# Patient Record
Sex: FEMALE | Race: WHITE
[De-identification: names, ages, dates, MRNs, and addresses within clinical notes are randomized per-mention and may not be internally consistent; named-entity substitution may affect disease eponyms.]

---

## 2017-04-09 ENCOUNTER — HOSPITAL ENCOUNTER (EMERGENCY)
Dept: HOSPITAL 80 - CED | Age: 42
Discharge: HOME | End: 2017-04-09
Payer: COMMERCIAL

## 2017-04-09 VITALS
DIASTOLIC BLOOD PRESSURE: 77 MMHG | RESPIRATION RATE: 16 BRPM | OXYGEN SATURATION: 94 % | HEART RATE: 73 BPM | SYSTOLIC BLOOD PRESSURE: 128 MMHG | TEMPERATURE: 98.6 F

## 2017-04-09 DIAGNOSIS — J06.9: Primary | ICD-10-CM

## 2017-04-09 DIAGNOSIS — J40: ICD-10-CM

## 2017-04-09 NOTE — UCPHY
H & P


Patient Type: Established


Chief Complaint Nursing Narrative: cough and "rattling" lungs since Tuesday.  

No flu immunization.


Time Seen by Provider: 04/09/17 10:17


HPI/ROS: 





CHIEF COMPLAINT:  Cough





History by patient





HISTORY OF PRESENT ILLNESS:  42-year-old woman who presents with 5 days of cough

, some nasal congestion, subjective fever and temperature measured as high as 

100 degrees F at home.  She has had some nausea but no vomiting or diarrhea.  

She also complains of red bumps on her abdomen that have popped up since this 

illness.  She has taken some ibuprofen at home with minimal relief.  She has 

had some body aches but she says these are chronic due to her chronic Lyme 

disease.  She does not smoke.  She works as a .  She did 

not have a flu shot this year.





REVIEW OF SYSTEMS:


As in HPI, and all other systems reviewed and are negative





- Personal History


LMP (Females 10-55): 15-21 Days Ago


Tetanus Vaccine Date: WITHIN 10 YRS





- Medical/Surgical History


Hx Asthma: No


Hx Chronic Respiratory Disease: No


Hx Diabetes: No


Hx Cardiac Disease: Yes


Hx Renal Disease: No


Hx Cirrhosis: No


Hx Alcoholism: No


Hx HIV/AIDS: No


Hx Splenectomy or Spleen Trauma: No


Other PMH: cardiomyopathy,Chronic pain.  Lyme's disease, celiac disease.  c-

sections x 3, tonsills, l knee arthroscopy





- Family History


Significant Family History: No pertinent family hx





- Social History


Smoking Status: Former smoker





- Physical Exam


Exam: 





General Appearance:  Alert and no distress. Obese


Head: normocephalic, atraumatic, no sinus tenderness


Eyes:  Pupils equal and round no injection.


OP: mucus membranes moist, no tonsillar enlargement, no exudates, positive 

erythema


Neck: no meningismus, no cervical nodes, no submandibular nodes


Respiratory:  Chest is nontender, lungs are clear to auscultation. Positive 

proximal clavicle and sternal border tenderness


Cardiac:  regular rate and rhythm.


Gastrointestinal:  Abdomen is soft and nontender, no masses, bowel sounds 

normal.


Musculoskeletal:  Neck is supple and nontender.


Extremities have full range of motion and are nontender.


Skin:  Multiple red blanching papules on her abdomen.


Constitutional: 


 Initial Vital Signs











Temperature (C)  37 C   04/09/17 10:16


 


Heart Rate  73   04/09/17 10:16


 


Respiratory Rate  16   04/09/17 10:16


 


Blood Pressure  128/77 H  04/09/17 10:16


 


O2 Sat (%)  94   04/09/17 10:16








 











O2 Delivery Mode               Room Air














Allergies/Adverse Reactions: 


 





Cephalosporins Allergy (Verified 04/09/17 10:19)


 








Home Medications: 














 Medication  Instructions  Recorded


 


HYDROCODONE BIT/ACETAMINOPHEN  04/09/17


 


NK [No Known Home Meds]  04/09/17


 


Supplements  04/09/17


 


Thyroid  04/09/17


 


VENLAFAXINE HCL  04/09/17


 


Valium  04/09/17














Medical Decision Making


ED Course/Re-evaluation: 





Patient presents with flu-like symptoms but out of the window for treating for 

influenza swab testing was not done.  There is no evidence of systemic toxicity

, hypoxia or respiratory compromise.  We discussed home care and conservative 

measures and return precautions.  Patient was discharged in stable condition.





Departure





- Departure


Disposition: [facility] IP


Clinical Impression: 


 Upper respiratory infection, Bronchitis





Condition: Good


Instructions:  Upper Respiratory Infection (ED)


Additional Instructions: 


You were seen by Dr. Miguelina Lemos today. Return for any worsening or new 

concerns.





Take Tylenol or ibuprofen as needed.  Try hot drinks with honey for your cough 

and sore throat.


Referrals: 


MATT JOY, DO [Primary Care Provider] - As per Instructions


Stand Alone Forms:  Work Excuse





- PQRS


PQRS Measurement: 





NA

## 2017-09-29 ENCOUNTER — HOSPITAL ENCOUNTER (INPATIENT)
Dept: HOSPITAL 80 - CED | Age: 42
LOS: 6 days | Discharge: HOME | DRG: 885 | End: 2017-10-05
Attending: SPECIALIST | Admitting: SPECIALIST
Payer: COMMERCIAL

## 2017-09-29 DIAGNOSIS — Z87.891: ICD-10-CM

## 2017-09-29 DIAGNOSIS — F60.9: ICD-10-CM

## 2017-09-29 DIAGNOSIS — F41.9: ICD-10-CM

## 2017-09-29 DIAGNOSIS — F12.90: ICD-10-CM

## 2017-09-29 DIAGNOSIS — E03.9: ICD-10-CM

## 2017-09-29 DIAGNOSIS — E66.9: ICD-10-CM

## 2017-09-29 DIAGNOSIS — K59.00: ICD-10-CM

## 2017-09-29 DIAGNOSIS — F33.2: Primary | ICD-10-CM

## 2017-09-29 DIAGNOSIS — A69.20: ICD-10-CM

## 2017-09-29 LAB
% IMMATURE GRANULYOCYTES: 0.3 % (ref 0–1.1)
ABSOLUTE IMMATURE GRANULOCYTES: 0.03 10^3/UL (ref 0–0.1)
ABSOLUTE NRBC COUNT: 0 10^3/UL (ref 0–0.01)
ADD DIFF?: NO
ADD MORPH?: NO
ADD SCAN?: NO
ANION GAP SERPL CALC-SCNC: 10 MEQ/L (ref 8–16)
ATYPICAL LYMPHOCYTE FLAG: 0 (ref 0–99)
CALCIUM SERPL-MCNC: 8.8 MG/DL (ref 8.5–10.4)
CHLORIDE SERPL-SCNC: 104 MEQ/L (ref 97–110)
CO2 SERPL-SCNC: 24 MEQ/L (ref 22–31)
CREAT SERPL-MCNC: 0.7 MG/DL (ref 0.6–1)
ERYTHROCYTE [DISTWIDTH] IN BLOOD BY AUTOMATED COUNT: 12.4 % (ref 11.5–15.2)
ETHANOL SERPL-MCNC: < 10 MG/DL (ref 0–10)
FRAGMENT RBC FLAG: 0 (ref 0–99)
GFR SERPL CREATININE-BSD FRML MDRD: > 60 ML/MIN/{1.73_M2}
GLUCOSE SERPL-MCNC: 89 MG/DL (ref 70–100)
HCT VFR BLD CALC: 41.9 % (ref 38–47)
HGB BLD-MCNC: 14.1 G/DL (ref 12.6–16.3)
LEFT SHIFT FLG: 0 (ref 0–99)
LIPEMIA HEMOLYSIS FLAG: 80 (ref 0–99)
MCH RBC BLDCO QN: 31 PG (ref 27.9–34.1)
MCHC RBC AUTO-ENTMCNC: 33.7 G/DL (ref 32.4–36.7)
MCV RBC AUTO: 92.1 FL (ref 81.5–99.8)
NRBC-AUTO%: 0 % (ref 0–0.2)
PLATELET # BLD: 263 10^3/UL (ref 150–400)
PLATELET CLUMPS FLAG: 0 (ref 0–99)
PMV BLD AUTO: 9.9 FL (ref 8.7–11.7)
POTASSIUM SERPL-SCNC: 4 MEQ/L (ref 3.5–5.2)
RBC # BLD AUTO: 4.55 10^6/UL (ref 4.18–5.33)
SODIUM SERPL-SCNC: 138 MEQ/L (ref 134–144)

## 2017-09-29 PROCEDURE — G0480 DRUG TEST DEF 1-7 CLASSES: HCPCS

## 2017-09-29 NOTE — EDPHY
H & P


Stated Complaint: depression and anxiety getting worse, si thought started 

yesterday,


HPI/ROS: 





CHIEF COMPLAINT: 


Depression, suicidal ideation





HISTORY OF PRESENT ILLNESS: 


Patient complains of history of anxiety and depression.  She is recently been 

transitioned from Effexor to Zoloft 2 weeks ago.  She has had the max dose of 

Zoloft, but her symptoms have worsened.  She has been increasingly depressed 

with as had suicidal ideation.  Her thoughts were to kill herself by taking 

pills.  She says "I wouldn't want it to be messy."  She contacted her PCP who 

instructed her to go to the emergency department.  She presented to the 

[facility] Emergency Department.  She was placed on an M1 hold by 

Dr. Bowen.  He contacted me informing of the scenario.  She was transferred 

here.  She has no modifying factors for this.  She does have previous suicidal 

attempts in the past by cutting her wrist.  No other associated complaints or 

modifying factors.





REVIEW OF SYSTEMS:


Ten systems reviewed and are negative unless otherwise noted in the HPI





PCP:


Dr. Joy





SPECIALISTS:


None





PAST MEDICAL HISTORY: 


Major depressive disorder, anxiety, hypothyroid, Lyme disease





PAST SURGICAL HISTORY:


Orthopedic surgeries,  x3, tonsillectomy





SOCIAL HISTORY:


They per eyes earlier nicotine.  Occasional alcohol.  CBD oils





FAMILY HISTORY:


Noncontributory





EXAMINATION


General Appearance:  Alert, no distress, tearful


Head: normocephalic, atraumatic


Eyes:  Pupils equal and round, no conjunctival pallor or injection


ENT, Mouth:  Mucous membranes moist


Neck:  Normal inspection, supple, non-tender


Respiratory:  Lungs are clear.  No retractions or distress


Cardiovascular:  Regular rate.  Pulses intact distally


Gastrointestinal:  Abdomen is soft and nontender


Back: non-tender, no bony abnormalities


Neurological:  A&O, nonfocal, normal gait


Skin:  Warm and dry, no rash


Extremities:  Nontender, no pedal edema


Psychiatric:  Depressed mood and affect.  Tearful.  Admits to suicidal thoughts 

with intent to take pills.  Remorseful of these feelings





DIFFERENTIAL DIAGNOSES:


Including but not limited to depression, suicidal ideation major depressive 

disorder








MDM:


6:55 p.m.


Major depression disorder with a change in medications recently with subsequent 

suicidal ideation.  Her plan was to take multiple pills.  She is very tearful 

and remorseful this.  She knows that she is on an M1 hold.  She is agreeable 

with this plan and wants help.  She is in no acute distress at this time.  

Laboratory studies have yet to be drawn.





8:05 p.m.


Patient has been medically cleared.  Mental health partner says been contacted 

for evaluation.  Patient remains calm and cooperative.





10:00 p.m.


Patient is still awaiting evaluation.  She remains tearful but, cooperative.





10:45 p.m.


Patient has been evaluated.  Recommendation is inpatient care.  Awaiting 

placement.





11:30 p.m.


Patient has been accepted to [address] [place] [address] for inpatient care.  Accepting 

physician Dr. Perez.  Dr. Bowling has completed the EMTALA form.





12:30 a.m.


Patient is currently sleeping.  She is awaiting transport.  Transport ETA is 1:

30 a.m..











Source: Patient, Family, RN/MD


Exam Limitations: No limitations





- Personal History


LMP (Females 10-55): Unknown


Current Tetanus Diphtheria and Acellular Pertussis (TDAP): Yes


Tetanus Vaccine Date: WITHIN 10 YRS





- Medical/Surgical History


Hx Asthma: No


Hx Chronic Respiratory Disease: No


Hx Diabetes: No


Hx Cardiac Disease: Yes


Hx Renal Disease: No


Hx Cirrhosis: No


Hx Alcoholism: No


Hx HIV/AIDS: No


Hx Splenectomy or Spleen Trauma: No


Other PMH: cardiomyopathy,Chronic pain.  Lyme's disease, celiac disease.  c-

sections x 3, tonsills, l knee arthroscopy





- Social History


Smoking Status: Former smoker


Constitutional: 


 Initial Vital Signs











Temperature (C)  97.9 F   17 17:41


 


Heart Rate  83   17 17:41


 


Respiratory Rate  18   17 17:41


 


Blood Pressure  143/95 H  17 17:41


 


O2 Sat (%)  94   17 17:41








 











O2 Delivery Mode               Room Air














Allergies/Adverse Reactions: 


 





Cephalosporins Allergy (Unknown, Verified 17 17:33)


 








Home Medications: 














 Medication  Instructions  Recorded


 


HYDROCODONE BIT/ACETAMINOPHEN  17


 


Thyroid  17


 


Valium  17


 


Ativan 2 mg tab  17


 


Sertraline HCl [Zoloft 100mg (*)]  17


 


Venlafaxine Xr [Effexor Xr 75MG  17





(*)]  














Medical Decision Making





- Data Points


Laboratory Results: 


 Laboratory Results





 17 19:19 





 17 19:19 





 











  17





  19:19 19:19 19:19


 


WBC      10.44 10^3/uL H 10^3/uL





     (3.80-9.50) 


 


RBC      4.55 10^6/uL 10^6/uL





     (4.18-5.33) 


 


Hgb      14.1 g/dL g/dL





     (12.6-16.3) 


 


Hct      41.9 % %





     (38.0-47.0) 


 


MCV      92.1 fL fL





     (81.5-99.8) 


 


MCH      31.0 pg pg





     (27.9-34.1) 


 


MCHC      33.7 g/dL g/dL





     (32.4-36.7) 


 


RDW      12.4 % %





     (11.5-15.2) 


 


Plt Count      263 10^3/uL 10^3/uL





     (150-400) 


 


MPV      9.9 fL fL





     (8.7-11.7) 


 


Neut % (Auto)      68.9 % %





     (39.3-74.2) 


 


Lymph % (Auto)      23.9 % %





     (15.0-45.0) 


 


Mono % (Auto)      5.5 % %





     (4.5-13.0) 


 


Eos % (Auto)      1.0 % %





     (0.6-7.6) 


 


Baso % (Auto)      0.4 % %





     (0.3-1.7) 


 


Nucleat RBC Rel Count      0.0 % %





     (0.0-0.2) 


 


Absolute Neuts (auto)      7.20 10^3/uL H 10^3/uL





     (1.70-6.50) 


 


Absolute Lymphs (auto)      2.50 10^3/uL 10^3/uL





     (1.00-3.00) 


 


Absolute Monos (auto)      0.57 10^3/uL 10^3/uL





     (0.30-0.80) 


 


Absolute Eos (auto)      0.10 10^3/uL 10^3/uL





     (0.03-0.40) 


 


Absolute Basos (auto)      0.04 10^3/uL 10^3/uL





     (0.02-0.10) 


 


Absolute Nucleated RBC      0.00 10^3/uL 10^3/uL





     (0-0.01) 


 


Immature Gran %      0.3 % %





     (0.0-1.1) 


 


Immature Gran #      0.03 10^3/uL 10^3/uL





     (0.00-0.10) 


 


Sodium    138 mEq/L mEq/L  





    (134-144)  


 


Potassium    4.0 mEq/L mEq/L  





    (3.5-5.2)  


 


Chloride    104 mEq/L mEq/L  





    ()  


 


Carbon Dioxide    24 mEq/l mEq/l  





    (22-31)  


 


Anion Gap    10 mEq/L mEq/L  





    (8-16)  


 


BUN    11 mg/dL mg/dL  





    (7-23)  


 


Creatinine    0.7 mg/dL mg/dL  





    (0.6-1.0)  


 


Estimated GFR    > 60   





    


 


Glucose    89 mg/dL mg/dL  





    ()  


 


Calcium    8.8 mg/dL mg/dL  





    (8.5-10.4)  


 


Urine Opiates Screen  NEGATIVE     





   (NEGATIVE)   


 


Urine Barbiturates  NEGATIVE     





   (NEGATIVE)   


 


Ur Phencyclidine Scrn  NEGATIVE     





   (NEGATIVE)   


 


Ur Amphetamine Screen  NEGATIVE     





   (NEGATIVE)   


 


U Benzodiazepines Scrn  NEGATIVE     





   (NEGATIVE)   


 


Urine Cocaine Screen  NEGATIVE     





   (NEGATIVE)   


 


U Marijuana (THC) Screen  NON-NEGATIVE  H     





   (NEGATIVE)   


 


Ethyl Alcohol    < 10 mg/dL mg/dL  





    (0-10)  














Departure





- Departure


Disposition: [facility] IP


Clinical Impression: 


 Suicidal ideation





Depression


Qualifiers:


 Depression Type: major depressive disorder Major depression recurrence: single 

episode Active/Remission status: currently active Major depression episode 

severity: severe Psychotic features: without psychotic features Qualified Code(s

): F32.2 - Major depressive disorder, single episode, severe without psychotic 

features





Condition: Fair


Referrals: 


MATT JOY DO [Primary Care Provider] - As per Instructions


Sudeep Perez MD [Medical Doctor] - As per Instructions

## 2017-09-29 NOTE — EDPHY
H & P


HPI/ROS: 





HPI





CHIEF COMPLAINT:  Depression, suicidal ideation





HISTORY OF PRESENT ILLNESS:  This patient is a very pleasant 42-year-old female

, has longstanding history depression, she is currently transitioning to 

Zoloft.  She has had worsening depression over the past week.  Thoughts of 

suicide.  Patient be placed on M1 hold here in the emergency room.  Blood draw 

for medical clearance.  And then transferred over to [facility] emergency room.

  I discussed with the patient as well as the .  They agree for this.  

They agree for transfer.





Past Medical History:  Depression, anxiety, Lyme disease





Past Surgical History:  No recent surgery





Social History:  Denies drugs alcohol tobacco products.





Family History:  Noncontributory








ROS   


REVIEW OF SYSTEMS:


A comprehensive 10 point review of systems is otherwise negative aside from 

elements mentioned in the history of present illness.








Exam   


Constitutional   triage nursing summary reviewed, vital signs reviewed, awake/

alert. 


Eyes   normal conjunctivae and sclera, EOMI, PERRLA. 


HENT   normal inspection, atraumatic, moist mucus membranes, no epistaxis, neck 

supple/ no meningismus, no raccoon eyes. 


Respiratory   clear to auscultation bilaterally, normal breath sounds, no 

respiratory distress, no wheezing. 


Cardiovascular   rate normal, regular rhythm, no murmur, no edema, distal 

pulses normal. 


Gastrointestinal   soft, non-tender, no rebound, no guarding, normal bowel 

sounds, no distension, no pulsatile mass. 


Genitourinary   no CVA tenderness. 


Musculoskeletal  no midline vertebral tenderness, full range of motion, no calf 

swelling, no tenderness of extremities, no meningismus, good pulses, 

neurovascularly intact.


Skin   pink, warm, & dry, no rash, skin atraumatic. 


Neurologic   awake, alert and oriented x 3, AAOx3, moves all 4 extremities 

equally, motor intact, sensory intact, CN II-XII intact, normal cerebellar, 

normal vision, normal speech. 


Psychiatric very tearful, flat affect.  Depressed.  Suicidal.


Heme/Lymph/Immune   no lymphadenopathy.





Differential Diagnosis:  Includes but is not limited to in a particular order 

severe depression, major depressive disorder, mood disorder, suicidal ideation 

with a plan.





Medical Decision Making:  Plan for this patient blood draw for medical 

clearance.  Transfer to [facility] ER for mental health evaluation.  Patient on 

M1 hold by myself.





Re-evaluation:





1745PM:  Patient placed on M1 hold.  Reason for M1 hold severe depression with 

suicidal ideation with specific plan to take medications overdose.


 at bedside updated.  Patient updated.  Agree for transfer [facility] [place].











Source: Patient





- Personal History


Tetanus Vaccine Date: WITHIN 10 YRS





- Medical/Surgical History


Hx Asthma: No


Hx Chronic Respiratory Disease: No


Hx Diabetes: No


Hx Cardiac Disease: Yes


Hx Renal Disease: No


Hx Cirrhosis: No


Hx Alcoholism: No


Hx HIV/AIDS: No


Hx Splenectomy or Spleen Trauma: No


Other PMH: cardiomyopathy,Chronic pain.  Lyme's disease, celiac disease.  c-

sections x 3, tonsills, l knee arthroscopy





- Social History


Smoking Status: Former smoker


Allergies/Adverse Reactions: 


 





Cephalosporins Allergy (Unknown, Verified 09/29/17 17:33)


 








Home Medications: 














 Medication  Instructions  Recorded


 


HYDROCODONE BIT/ACETAMINOPHEN  04/09/17


 


Thyroid  04/09/17


 


Valium  04/09/17


 


Ativan 2 mg tab  09/29/17


 


Sertraline HCl [Zoloft 100mg (*)]  09/29/17


 


Venlafaxine Xr [Effexor Xr 75MG  09/29/17





(*)]  














Departure





- Departure


Disposition: [facility] Inpatient Acute


Clinical Impression: 


Depression


Qualifiers:


 Depression Type: major depressive disorder Major depression recurrence: single 

episode Active/Remission status: currently active Major depression episode 

severity: severe Psychotic features: without psychotic features Qualified Code(s

): F32.2 - Major depressive disorder, single episode, severe without psychotic 

features





Condition: Fair


Referrals: 


MATT JOY DO [Primary Care Provider] - As per Instructions

## 2017-09-30 NOTE — BAPA
[f 
rep st]



                                                  ADMISSION PSYCHIATRIC 
ASSESSMENT





DATE OF SERVICE:  09/30/2017



CHIEF COMPLAINT:  "I'm tired of feeling this way.  I just feel hopeless.  This 
week, my  was gone and I stayed in bed.  I had thoughts of suicide by 
overdose on my medications."



HISTORY OF PRESENT ILLNESS:  This is a 42-year-old female with a history of 
depression and anxiety who has been treated for a long time by her PCP, Radha Bhandari D.O.  She has been treated with Effexor XR for several years and over 
the past couple of months she says that her anxiety and her depression has been 
worse.  She saw her PCP on 09/12/2017 and Dr. Bhandari decided to cross-titrate 
the patient off Effexor and onto Zoloft, so she began going down on her Effexor 
dose and started her on 25 mg of Zoloft. 



The patient said that during the cross-titration that she started having more 
symptoms of depression.  She said that her anxiety also got worse.  It would be 
worse for several days.  It would "get really bad," which she said happened the 
week prior to admission.  She said once the anxiety subsides, "I realize how on 
bearable the depression is. I'm tired of feeling this way."  



Patient admitted that she felt helpless, hopeless.  She was socially isolative.
  She was lethargic.  Psychomotor retardation.  She was sad.  She said that her 
 had been gone during this past week on business and she said "I stayed 
in bed most of the week because I could." She said she was having thoughts of 
overdosing on her medications.  She said, "I don't want to leave a mess for 
others to have to clean up.  I wouldn't do it because I wouldn't leave my kids.
  I just never remember a time when I wasn't depressed.  I would give anything 
to not feel this way." 



The patient says this is the first time she has thought about suicide since she 
was a teenager.  She said she attempted suicide as a teenager but said, "it was 
not serious. I was attention seeking."  The patient was not hospitalized.  She 
reports having severe postpartum depression after her 1st child 13 years ago.  
Since then, anxiety and depression have "ebbed and flowed." 



The patient reports having Lyme disease for the past 10 years.  She says that 
this causes her to have chronic pain and muscle spasms that flare up.  She is 
being treated by a naturopath as well as using medical marijuana for the pain 
as well as for insomnia.



PAST PSYCHIATRIC HISTORY:  The patient says that she first had experienced 
depression as a teenager.  She made a suicide attempt by overdose but says, "it 
was not serious.  I was just attention seeking." She has no prior psychiatric 
hospitalizations.  She has never been treated by a psychiatrist.  Has been 
getting her medications from her PCP. 



She said ever since she experienced postpartum depression approximately 13 
years ago, she says anxiety and depression have "ebbed and flowed."  She cannot 
remember when she first started taking psychiatric medications but says "it has 
been a long time."  They have always been prescribed by her primary care 
physician.  She has seen a therapist in the past but says it has been "a few 
years" since she has been in therapy.



ALLERGIES:  Cephalosporins.



CURRENT MEDICATIONS:  The patient is currently taking Effexor XR 75 mg.  The 
plan was to decrease to 37.5 mg in another week and then off.  She is also 
taking 50 mg of Zoloft.  The plan was to continue on 50 mg for another week and 
then go to 100 mg. Patient also is prescribed Valium 5 mg p.r.n. for muscle 
spasms due to Lyme disease.  



She has also been prescribed Ativan in the past p.r.n., but she says she has 
not taken any for approximately 6 months, until recently when she could not get 
a prescription from her outpatient PCP.  She got her neighbor, whom she says is 
an MD, to write a prescription for her.  She says she told her PCP about this 
when she saw her on 09/12/2017 and her PCP just told her to go ahead and finish 
that prescription, 



The patient is also taking a number of supplements from her naturopath, Keira Wright, who works at [facility].  She is on kavanice p.m. for sleep.  She is 
also on Nature-Throid, which is a supplement containing liothyronine and 
levothyroxine.  She also uses marijuana.  She says that she has a vape pen and 
she uses indica strain of marijuana for pain and for insomnia.



PAST MEDICAL HISTORY:  Patient says she has had been diagnosed with Lyme 
disease for the last 10 years.  She gets muscle spasms and chronic pain.  The 
patient also has had abnormal thyroid panels in the past, but she states she 
has never been on prescription medications.  She has only been on naturopathic 
meds for this.  Patient has had multiple surgeries.  She said that she has had 
left gastrocnemius tendon tear, left knee arthroscopically, 3 C sections, 
bilateral tubal ligations, and tonsillectomy.



SOCIAL HISTORY:  The patient is  and has 3 children.  2 boys ages 13 and 
11 and an 8-year-old daughter.  She currently lives with her  and 
children.  She noted that her  and older son each suffer from bipolar 
disorder and her other son suffers from anxiety.  Patient notes having "decent 
friends and support."



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  When asked 
what she does for fun, patient says "nothing." She says that in the past she 
liked to paint, but the patient's  states that the patient has not 
enjoyed many of the things that gave her pleasure in the past.



LEGAL HISTORY:  The patient denied current or past legal issues.



Religion AND SPIRITUAL PREFERENCE:  The patient reported being Muslim.



SUBSTANCE USE HISTORY:  The patient's first use of alcohol at the age of 11.  
She says that she drinks 2 drinks maybe every couple of weeks.  Last reported 
use occurred 1 week ago.  Patient reports she first started using marijuana 
when she was 13 years old.  She recently reports using medical marijuana for 
pain management and for insomnia.  She says that she uses oil to vape and 
smokes 2-3 hits on a daily basis.  Last use was the day of admission.  The 
patient denies use of any other substances.



FAMILY HISTORY:  The patient's denied any family psychiatric history, but says 
that both of her biological parents abused alcohol.



ADMISSION LABS:  The patient's white cell count was 10.44, hemoglobin was 14.1, 
hematocrit was 41.9.  Platelet count was 263, sodium level was 138, potassium 
level was 4.0, chloride was 104, BUN was 11, creatinine was 0.7, glucose was 89
, calcium was 8.8.  Her urine tox screen was negative for all drugs of abuse 
except for marijuana, which was positive.  Her ethyl alcohol level was less 
than 10.  There was no thyroid panel done in the ED.



MENTAL STATUS EXAMINATION:  The patient is an overweight, otherwise well-
developed female sitting at a table with an ice pack on her neck.  Complaining 
of headache and neck tension.  She is otherwise pleasant and cooperative.  Her 
affect is euthymic, although she describes her mood as "depressed."  Her 
thought process is linear and goal directed.  Her thought content reveals no 
evidence of psychosis or saida.  She is alert and oriented x4.  She currently 
denies any active thoughts of suicide and denies any intent or plan to harm 
herself while she is in the hospital.  Her intellect appears to be average 
based upon education, occupational history, fund of knowledge, and vocabulary.  
Her insight and judgment both appear to be fair.



ASSESSMENT/DIAGNOSIS:  

1.  Major depressive disorder.  Recurrent, severe.

2.  Rule out substance-induced mood disorder.

3.  Cannabis use disorder.  Severe.

4.  Anxiety disorder.  Not otherwise specified.

5.  Psychosocial stressors include difficulties in the home life, conflict with 
, financial problems, currently not working, and lack of social support 
system.



PLAN OF TREATMENT:  

1.  Admit patient to the behavioral health services inpatient unit on an M1 
hold.

2.  Monitor closely for safety and on suicide precautions.  Patiently currently 
is able to contract for safety.  Denies any intent or plan to harm herself 
while she is in the hospital.

3.  Will resume outpatient medications, but continue the cross titration of 
Effexor and Zoloft.  The patient did agree to increase her dose of Zoloft to 
100 mg daily.  Continue on Effexor 75 mg, with a plan to decrease it eventually 
to 37.5, and then stop it all together once the patient is more stable.

4.  Discussed use of benzodiazepines to help with anxiety.  Since the patient 
has been off the Ativan for more than 6 months until recently starting to use 
it again, recommend stabilizing her depression, which will hopefully help with 
her anxiety.  She has Valium ordered p.r.n. to help with the muscle spasms in 
her neck, which she is currently experiencing.  This should help with anxiety.  
Recommended not using any additional benzodiazepines due to their risk for 
addiction.  The patient agreed with this plan.  

5.  The patient did request something for sleep and agreed to take melatonin.  
She usually uses an over-the-counter supplement that has melatonin in it as an 
outpatient and says this helps, but realizes that nothing is going to be as 
sedating for her as the marijuana that she uses on a regular basis.  I talked 
to the patient about the mood-altering effects of marijuana use and the 
prolonged effects, both on cognition and judgment as well as mood, impulsivity, 
and decision making.  The patient acknowledged that she understood the 
complications and potential adverse effects of marijuana use for people with 
mental health problems, but did not agree to alter or change her use of 
marijuana at this time.

6.  MD strongly recommended that the patient be under the care of a psychiatrist
, a mental health specialist for prescribing medications as well as for 
monitoring her drug regimen.  Also recommend that the patient start seeing a 
therapist again to help manage her anxiety more effectively as well as improve 
her ability to cope and tolerate stressors that may be affecting her depression 
as well as her anxiety due to conflicts at home and difficulties with 
relationships and decisions that the patient has to make about her employment.  
Patient agreed that this was a good plan and said that her  was 
currently researching possible therapists for her to see.

7.  Will engage patient in individual, group, and milieu therapies.

8.  Estimated length of stay is 3-5 days.





Job #:  [id number]

MTDD

## 2017-09-30 NOTE — BCON
[f rep st]



                                                  BEHAVIORAL HEALTH CONSULTATION





DATE OF CONSULTATION:  2017



REFERRING PHYSICIAN:  Sudeep Perez MD



REASON FOR ADMISSION:  I am asked by Dr. Sudeep Perez to assess the patient who is admitted to the beh
avioral health unit with suicidal ideation.



HISTORY:  The patient is a 42-year-old woman with longstanding history of depression and has been on 
antidepressants.  Recently she was on another antidepressant which did not seem to be controlling her
 symptoms and so she was taken off that medicine and started 2 weeks ago on Zoloft initially at 50 mg
 daily.  For the last 5 days, she has had significant suicidal ideation and called her primary care d
gaston who recommended an emergency room visit.  She came into the ER last night and was eventually tr
ansferred to the behavioral health unit here this morning.  The patient at this time still does have 
some suicidal thoughts.  She does have symptoms of ongoing depression. 



There has been no confusion, hallucinations, anything that sounds like delusional thoughts, severe an
xiety, or other signs of mental health disorder besides depression. 



She denies any kind of fevers, tremors, weight gain or loss, or any other acute physical or medical s
ymptoms.  The patient does state she has chronic Lyme disease diagnosed by an osteopathic physician, 
as well as chronic fatigue syndrome.  She is being treated for those syndromes as an outpatient.  She
 cites chronic fatigue, diffuse muscular and skeletal aches and headaches.  The symptoms of the syndr
omes as she describes are unchanged from her chronic symptoms. 



She is not a significant user of alcohol or street drugs.  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



REVIEW OF SYSTEMS:  Unrevealing other than as above in the current medical history.



PAST MEDICAL HISTORY:  Depression, chronic fatigue syndrome, and per the patient, she has been diagno
sed with chronic Lyme disease.



PAST SURGICAL HISTORY:  She has also had surgeries including tonsillectomy,  section, tubal l
igation, knee arthroscopy.



FAMILY HISTORY:  Her father  of cancer.  Her mother had breast cancer.



SOCIAL HISTORY:  She is  and has 3 children ages 8-11.  Lives with her  and children.  
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



PHYSICAL EXAMINATION:  VITAL SIGNS:  Blood pressure minimally elevated at initial presentation but ha
s normalized otherwise stable without fever.  NEUROLOGIC:  The patient is wide awake, alert, attentiv
e and interacts normally with me.  She has normal speech and language function.  No cranial nerve abn
ormalities.  Normal pupils.  No focal weakness.  Normal gait and balance and no tremors or fasciculat
ions.  Pupil responses are normal.  No evidence of injury anywhere.  She does have some tattoos on he
r skin somewhat older.  No signs of infection or other problems there.  HEENT AND NECK:  Unremarkable
.  RESPIRATORY: Respirations not labored.  LUNGS:  Clear.  HEART:  Regular.  No murmur.  ABDOMEN:  Un
remarkable.  EXTREMITIES:  Warm and well perfused without deformities.  The joints are unremarkable. 
 The pulses are good.  The nails look normal.  There are no rashes or other concerning lesions on the
 skin.



LABORATORY TESTS:  Done in the ER and include an initial white blood cell count 10,000 with 7000 neut
rophils otherwise normal.  CBC:  Unremarkable metabolic panel.  Non negative marijuana test.  Otherwi
se negative for drugs of abuse or alcohol.  There is a TSH very slightly low at 1.  When I asked abou
t this, she tells me that she is on a Nature Thyroid preparation.  She says, "My thyroid is out of wh
ack" but does not have the ability at this point to describe a specific thyroid diagnosis.



IMPRESSION:  

1.  Suicidal ideation complicating chronic depression after a recent change in antidepressant dose.  
This is being further assessed and managed by her psychiatry and mental health team here.

2.  Minimally depressed thyroid-stimulating hormone.  The cause and significance of this are uncertai
n at this time.  However, I noticed that she is taking a preparation called Nature Thyroid.  It is 2.
25 grains.  I will have to try and find out from our pharmacist exactly what this is, but I presume i
t is probably some type of thyroid hormone replacement.  This would potentially indicate she is eithe
r getting too much of this preparation or getting it inappropriately somehow.  The only medical reaso
n to be taking a thyroid hormone replacement in doses that would lead to a low TSH is if one is tryin
g to suppress growth of a thyroid tumor.  She does not give a specific history of having thyroid tumo
rs.  I do not think that this dose of thyroid with a thyroid-stimulating hormone of 1 is likely to be
 negatively impacting her depression symptoms in a very significant way, though it would be probably 
wiser to have a thyroid-stimulating hormone in a normal range given her illness.  I will order a free
 T3 and free T4 level to be done by the laboratory on a chem sample so that we can get a better asses
sment of her actual thyroid status.





Job #:  [id number]

## 2017-10-01 RX ADMIN — VENLAFAXINE HYDROCHLORIDE SCH MG: 75 CAPSULE, EXTENDED RELEASE ORAL at 08:41

## 2017-10-01 RX ADMIN — SERTRALINE HYDROCHLORIDE SCH MG: 100 TABLET ORAL at 08:41

## 2017-10-01 RX ADMIN — DIAZEPAM PRN MG: 5 TABLET ORAL at 23:55

## 2017-10-01 RX ADMIN — IBUPROFEN PRN MG: 800 TABLET ORAL at 08:51

## 2017-10-02 RX ADMIN — IBUPROFEN PRN MG: 800 TABLET ORAL at 08:34

## 2017-10-02 RX ADMIN — POLYETHYLENE GLYCOL 3350 SCH: 17 POWDER, FOR SOLUTION ORAL at 14:21

## 2017-10-02 RX ADMIN — VENLAFAXINE HYDROCHLORIDE SCH MG: 75 CAPSULE, EXTENDED RELEASE ORAL at 08:34

## 2017-10-02 RX ADMIN — POLYETHYLENE GLYCOL 3350 SCH GM: 17 POWDER, FOR SOLUTION ORAL at 15:20

## 2017-10-02 RX ADMIN — SERTRALINE HYDROCHLORIDE SCH MG: 100 TABLET ORAL at 08:34

## 2017-10-02 RX ADMIN — DIAZEPAM PRN MG: 5 TABLET ORAL at 21:16

## 2017-10-02 RX ADMIN — Medication PRN MG: at 21:15

## 2017-10-02 NOTE — SOAPPROG
SOAP Progress Note


Assessment/Plan: 


Assessment:








* Exhausted S hyper thyroidism.  Advise abstaining from thyroid supplementation 

and rechecking TSH and thyroid hormones in approximately 4 weeks.  It is 

unlikely that this supplement has been doing her any harm.  She is without any 

symptoms referable to the thyroid 1 way or another.  


* Chronic pain and chronic fatigue.  Unclear whether this has anything to do 

with chronic Lyme disease.  Advise having pharmacist evaluate the supplements 

to determine whether there is any harm from taking them or any interactions 

with other medications.  I see no harm in the use of a hot water bottle for her 

pain complaints.  She might benefit from a gradually increasing exercise 

program beginning with a level of exercise which does not cause a flare of her 

symptoms.


* Constipation.  Will provide MiraLax.


* Obesity.  Advise consult with dietitian.








10/02/17 12:41





Subjective: 





Asked to see patient about thyroid test results.  TSH was low at 0.105, free T4 

was low at 0.55 and free T3 was normal.  She reports that she has been 

prescribed a thyroid supplement by a naturopath.  She endorses some symptoms 

consistent with hyperthyroidism including feeling warm and sweaty.  She denies 

weight loss, tremor or increased frequency of bowel movements.  She requests a 

stool softener or MiraLax.





She reports taking multiple other supplements for a diagnosis of chronic Lyme 

disease which she reports causes her joint pain as well as fatigue.


Objective: 





 Vital Signs











Temp Pulse Resp BP Pulse Ox


 


 36.5 C   74   15   124/78 H  93 


 


 10/02/17 04:59  10/02/17 04:59  10/02/17 04:59  10/02/17 04:59  10/02/17 04:59














Physical Exam





- Physical Exam


General Appearance: WD/WN, alert, no apparent distress, obese


Neck: full range of motion, supple, No thyromegaly


Skin: normal color, warm/dry


Neuro/Psych: no motor/sensory deficits, alert, normal mood/affect, oriented x 3

, other (No tremor.)





ICD10 Worksheet


Patient Problems: 


 Problems











Problem Status Onset


 


Cannabis abuse without complication Acute  


 


Major depressive disorder, recurrent episode, severe with mixed features Acute  


 


Suicidal ideation Acute  


 


Thyroid condition Acute  


 


Trauma in childhood Acute  


 


Depression Acute
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
## 2017-10-03 RX ADMIN — VENLAFAXINE HYDROCHLORIDE SCH MG: 37.5 TABLET ORAL at 08:34

## 2017-10-03 RX ADMIN — POLYETHYLENE GLYCOL 3350 SCH GM: 17 POWDER, FOR SOLUTION ORAL at 08:34

## 2017-10-03 RX ADMIN — Medication PRN MG: at 20:30

## 2017-10-03 RX ADMIN — SERTRALINE HYDROCHLORIDE SCH MG: 100 TABLET ORAL at 08:34

## 2017-10-03 NOTE — SOAPPROG
SOAP Progress Note


Assessment/Plan: 


Assessment:


MDD, recurrent, severe with mixed features


Borderline PD


History of PTSD


Cannabis abuse


Tubal Ligation





Patient had depressive symptoms with mood lability and insomnia and SI when 

admitted to hospital.  


Patient reports feeling improved this AM but struggling with thoughts of death 

and hopelessness, but has been engaged in individual/group therapy on unit, has 

good insight, good support group.  


Patient had signiticant mood lability and SI on 10/2/17.





Plan:


Hospitalist recommends discontinue OTC thyroid supplement and recheck TFTs in 

one month.  Patient sees PCP at [facility].


Discussed dangers of cannabis including anxiety and psychosis and apathy


Education about PTSD and MDD with mixed features


Continue Zoloft 100mg (recently increased)


Taper Effexor - reduced 37.5mg today


Continue Lamictal 25mg PO QHS, started 10/3/17.  Discussed risk of life 

threatening rash (Juan Pablo Evan Syndrome)


Continue Hydroxyzine 25mg PO Q6 hours PRN anxiety, hasn't taken yet


Seroquel 25mg PRN agitation


Monitor mood stability risk of self-harm; change to safety precautions


Refer to DBT IOP, patient given handout on Borderline PD








10/03/17 12:08





10/03/17 12:11





Subjective: 





Patient reports overall feeling improved today 'better than before.'


Reports sleeping well overnight.  Endorses numerous symptoms on Borderline PD 

handout and agrees to go to IOP program after discharge.  Took HS Lamictal last 

night without side effects.  Denies severe anxiety yesterday or today and hasn'

t tried PRN Hydroxyzine.  Reports no Effexor withdrawal symptoms so far, took 

lower dose 37.5mg this AM.  Reports struggling with thoughts that she would be 

better of dead but denies plan to harm self or intent to harm self.  Reports 

brief intense mood swings but reports creating a journal to schedule day to 

include activities to improve mood, including walking dog, exercise, taking 

children to the park.  Reports feeling she is more self aware of emotions.  

Reports wanting to live for  and children but fearful she will 

deteriorate and possibly harm self if discharged, reports feeling somewhat 

improved today but unsure if her mood is stable.


Objective: 





 Vital Signs











Temp Pulse Resp BP Pulse Ox


 


 36.5 C   71   12   115/65   96 


 


 10/03/17 06:00  10/03/17 06:00  10/03/17 06:00  10/03/17 06:00  10/03/17 06:00








Staff report patient took PRN Valium 5mg last night, slept 8.5 hours.





Patient has been calm and cooperative on unit, at times appearing dysphoric.





Today patient is alert, ambulatory, cooperative, appears mildly anxious; 

reactive affect  Speech RRR.  No tremors or weakness.  Reports brief thoughts 

of being dead but denies suicidal plan or intent.  Denies HI or AH or paranoia.

  Intact memory,  Fair insight, appropriate judgment.











- Time Spent With Patient


Time Spent With Patient: 





30





- Pending Discharge


Pending Discharge Within 24 Hours: No


Pending Discharge Within 48 Hours: No





ICD10 Worksheet


Patient Problems: 


 Problems











Problem Status Onset


 


Borderline personality disorder Acute  


 


Thyroid condition Acute  


 


Cannabis abuse without complication Acute  


 


Major depressive disorder, recurrent episode, severe with mixed features Acute  


 


Depression Acute  


 


Suicidal ideation Acute

## 2017-10-04 VITALS — TEMPERATURE: 98.2 F

## 2017-10-04 RX ADMIN — VENLAFAXINE HYDROCHLORIDE SCH MG: 37.5 TABLET ORAL at 08:54

## 2017-10-04 RX ADMIN — Medication PRN MG: at 21:35

## 2017-10-04 RX ADMIN — SERTRALINE HYDROCHLORIDE SCH MG: 100 TABLET ORAL at 08:55

## 2017-10-04 RX ADMIN — IBUPROFEN PRN MG: 800 TABLET ORAL at 09:52

## 2017-10-04 RX ADMIN — POLYETHYLENE GLYCOL 3350 SCH GM: 17 POWDER, FOR SOLUTION ORAL at 08:55

## 2017-10-04 NOTE — SOAPPROG
SOAP Progress Note


Assessment/Plan: 


Assessment:


MDD, recurrent, severe with mixed features


Borderline PD


History of PTSD


Cannabis abuse


Tubal Ligation





Patient had depressive symptoms with mood lability and insomnia and SI when 

admitted to hospital.  


Patient reports some continued anxiety symptoms but is more hopeful and denies 

thoughts of death/suicide.


Patient reports tolerating Zoloft but having excessive sedation with 25mg 

Hydroxyzine; tolerating Effexor taper so far.





Plan:


Reviewed strengths, positive thoughts about self/future, coping skills, exercise


Discharge in AM if symptoms stable


Reviewed with patient that hospitalist recommends discontinue OTC thyroid 

supplement and recheck TFTs in one month.  Patient sees PCP at [facility].


Discussed dangers of cannabis including anxiety and psychosis and apathy


Continue Zoloft 100mg (recently increased)


Discontinue Effexor


Continue Lamictal 25mg PO QHS, started 10/3/17.  Discussed risk of life 

threatening rash (Juan Pablo Evan Syndrome)


Reduce  Hydroxyzine 10mg PO Q6 hours PRN anxiety


Patient referred to psychiatric IOP 





10/04/17 11:52





Subjective: 





Patient reports feeling 'OK.'  Reports taking Hydroxyzine 25mg last night and 

sleeping fairly well but feeling overly sedated/tired this AM.  Reports some 

anxiety in AM after taking AM medications, reports anxiety involved fear of doom

, feeling dizzy and disconnected, and feeling weak and tired.  Reports anxiety 

symptoms are chronic and occurred prior to taking psychiatric medications.  

Reports recognizing that she has anxiety and needs to using coping skills to 

manage symptoms, including walking dog, spending time with children, coloring 

books, and using relaxation skills.  Reports stable mood.  No signs of a rash.  

Denies agitation or irritability.  Reports  has visited daily and has 

been supportive.  Denies thoughts of death or suicide today. 


Objective: 





 Vital Signs











Temp Pulse Resp BP Pulse Ox


 


 36.8 C   70   12   114/66   96 


 


 10/04/17 06:00  10/04/17 06:00  10/04/17 06:00  10/04/17 06:00  10/04/17 06:00








Alert WF, overweight, multiple tattoos.  Speech RRR, briefly loud.  Mood 'OK.'  

Affect euthymic, mildly anxious.  Thoughts organized, denies SI or HI.  Denies 

AH or paranoia.  Good insight, appropriate judgment.





Glucose 101 this AM.





- Time Spent With Patient


Time Spent With Patient: 





15





- Pending Discharge


Pending Discharge Within 24 Hours: Yes


Pending Discharge Date: 10/05/17


Pending Discharge Time: 11:15





ICD10 Worksheet


Patient Problems: 


 Problems











Problem Status Onset


 


Borderline personality disorder Acute  


 


Cannabis abuse without complication Acute  


 


Major depressive disorder, recurrent episode, severe with mixed features Acute  


 


Suicidal ideation Acute  


 


Thyroid condition Acute  


 


Depression Acute

## 2017-10-05 VITALS
HEART RATE: 58 BPM | RESPIRATION RATE: 14 BRPM | SYSTOLIC BLOOD PRESSURE: 126 MMHG | DIASTOLIC BLOOD PRESSURE: 59 MMHG | OXYGEN SATURATION: 93 %

## 2017-10-05 RX ADMIN — IBUPROFEN PRN MG: 800 TABLET ORAL at 11:01

## 2017-10-05 RX ADMIN — SERTRALINE HYDROCHLORIDE SCH MG: 100 TABLET ORAL at 08:25

## 2017-10-05 RX ADMIN — POLYETHYLENE GLYCOL 3350 SCH GM: 17 POWDER, FOR SOLUTION ORAL at 08:25
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
## 2017-10-12 ENCOUNTER — HOSPITAL ENCOUNTER (EMERGENCY)
Dept: HOSPITAL 80 - CED | Age: 42
Discharge: HOME | End: 2017-10-12
Payer: COMMERCIAL

## 2017-10-12 VITALS
SYSTOLIC BLOOD PRESSURE: 119 MMHG | HEART RATE: 76 BPM | DIASTOLIC BLOOD PRESSURE: 86 MMHG | OXYGEN SATURATION: 94 % | TEMPERATURE: 98.6 F

## 2017-10-12 VITALS — RESPIRATION RATE: 18 BRPM

## 2017-10-12 DIAGNOSIS — Z87.891: ICD-10-CM

## 2017-10-12 DIAGNOSIS — R04.0: Primary | ICD-10-CM

## 2017-10-12 PROCEDURE — 2Y41X5Z PACKING OF NASAL REGION USING PACKING MATERIAL: ICD-10-PCS | Performed by: EMERGENCY MEDICINE

## 2017-10-12 NOTE — EDPHY
H & P


Stated Complaint: NOSE BLEED 3 HOURS PTA


Time Seen by Provider: 10/12/17 12:29


HPI/ROS: 





Chief Complaint:  Epistaxis





HPI:  32-year-old woman having epistaxis for the last 3 hours from her right 

nostril.  She has had epistaxis in the past and had had cautery.  She is not on 

any blood thinning medications.  No recent traumas.  No recent illness.  Not 

lightheaded.





ROS:  10 point Review of Systems is negative except as noted in the HPI.





PMH:  Lyme disease





Social History: No smoking, no alcohol,  no recreational drug use





Family History: non-contributory





Physical Exam:


Gen: Awake, Alert, No Distress


HEENT:  


     Nose:  Nasal clamp in place, mild blood from her right nostril


     Eyes: PERRLA, EOMI


     Mouth: Moist mucosa 


Neck: Supple, no JVD


Ext: no edema, non-tender


Skin: no rash


Neuro: CN II-XII intact, Sensation grossly intact, Strength 5/5 in bilateral 

upper and lower extremities








- Personal History


LMP (Females 10-55): Now


Current Tetanus/Diphtheria Vaccine: Yes


Tetanus Vaccine Date: WITHIN 10 YRS





- Medical/Surgical History


Hx Asthma: No


Hx Chronic Respiratory Disease: No


Hx Diabetes: No


Hx Cardiac Disease: No


Hx Renal Disease: No


Hx Cirrhosis: No


Hx Alcoholism: No


Hx HIV/AIDS: No


Hx Splenectomy or Spleen Trauma: No


Other PMH: CELIAC.  LYME DISEASE.  OTHOPEDIC.  C SECTION.  TUBAL LIGATION.  

TONSILS.  cardiomyopathy,Chronic pain.  Lyme's disease, celiac disease.  c-

sections x 3, tonsills, l knee arthroscopy





- Social History


Smoking Status: Former smoker


Constitutional: 


 Initial Vital Signs











Temperature (C)  36.7 C   10/12/17 12:28


 


Heart Rate  67   10/12/17 12:28


 


Respiratory Rate  18   10/12/17 12:28


 


Blood Pressure  134/96 H  10/12/17 12:28


 


O2 Sat (%)  96   10/12/17 12:28








 











O2 Delivery Mode               Room Air














Allergies/Adverse Reactions: 


 





Cephalosporins Allergy (Unknown, Verified 10/12/17 12:27)


 








Home Medications: 














 Medication  Instructions  Recorded


 


LaMICtal  10/12/17


 


Sertraline HCl  10/12/17














Medical Decision Making


Procedures: 





Procedure:  Epistaxis control.


After verbal consent was obtained, the patient was anesthetized with 4% 

lidocaine and Yossi-Synephrine.  The anterior epistaxis was identified.  The 

patient was treated with silver nitrate cautery.  Following the procedure the 

patient was re-examined and the bleeding was well controlled.  The patient 

tolerated the procedure well.  The procedure was performed by myself.





Departure





- Departure


Disposition: Home, Routine, Self-Care


Clinical Impression: 


 Acute anterior epistaxis





Condition: Good


Instructions:  Nosebleed (ED)


Additional Instructions: 


Follow up with Ear, nose and throat doctor, Dr. Toribio, in 2-3 days.


Return to the emergency department for continued bleeding, lightheadedness, 

nausea, vomiting, or any other concerns.


Referrals: 


MATT JOY DO [Primary Care Provider] - As per Instructions


Jeff Toribio MD [Medical Doctor] - As per Instructions

## 2018-02-15 ENCOUNTER — HOSPITAL ENCOUNTER (OUTPATIENT)
Dept: HOSPITAL 80 - CIMAGING | Age: 43
End: 2018-02-15
Attending: GENERAL ACUTE CARE HOSPITAL
Payer: COMMERCIAL

## 2018-02-15 DIAGNOSIS — D18.03: Primary | ICD-10-CM

## 2018-04-24 ENCOUNTER — HOSPITAL ENCOUNTER (OUTPATIENT)
Dept: HOSPITAL 80 - BRMIMAGING | Age: 43
End: 2018-04-24
Attending: SPECIALIST
Payer: COMMERCIAL
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
## 2018-04-24 DIAGNOSIS — M79.641: Primary | ICD-10-CM

## 2018-08-26 ENCOUNTER — HOSPITAL ENCOUNTER (EMERGENCY)
Dept: HOSPITAL 80 - FED | Age: 43
Discharge: LEFT BEFORE DISCHARGE | End: 2018-08-26
Payer: COMMERCIAL

## 2018-08-26 VITALS — SYSTOLIC BLOOD PRESSURE: 119 MMHG | DIASTOLIC BLOOD PRESSURE: 74 MMHG

## 2018-08-26 DIAGNOSIS — H57.10: ICD-10-CM

## 2018-08-26 DIAGNOSIS — R51: Primary | ICD-10-CM

## 2018-08-26 DIAGNOSIS — R21: ICD-10-CM

## 2018-08-26 DIAGNOSIS — R11.0: ICD-10-CM

## 2019-02-19 ENCOUNTER — HOSPITAL ENCOUNTER (OUTPATIENT)
Dept: HOSPITAL 80 - CIMAGING | Age: 44
End: 2019-02-19
Attending: FAMILY MEDICINE
Payer: COMMERCIAL

## 2019-02-19 DIAGNOSIS — Z12.31: Primary | ICD-10-CM

## 2019-02-19 DIAGNOSIS — R92.8: ICD-10-CM

## 2019-03-21 ENCOUNTER — HOSPITAL ENCOUNTER (OUTPATIENT)
Dept: HOSPITAL 80 - EMCIMAGING | Age: 44
End: 2019-03-21
Attending: FAMILY MEDICINE
Payer: COMMERCIAL

## 2019-03-21 DIAGNOSIS — R92.8: Primary | ICD-10-CM

## 2019-03-21 DIAGNOSIS — Z80.3: ICD-10-CM

## 2023-06-20 NOTE — SOAPPROG
SOAP Progress Note


Assessment/Plan: 


Assessment:























10/01/17 13:16


Plan:


1. Just increased Zoloft to 100mg daily, cross-titrating with Effexor XR 75mg 

daily. Patient tolerating new dose of Zoloft, no complaints.


2. TSH is 0.105 and free T4 is 0.55. Recommend consult with hospitalist to 

treat with different dose of Nature Throid or new medications. Explained to 

patient that her hypothyroidism is likely making her depression worse. Will 

need to treat in order to improve mood sxs. 


3. Patient really wants to d/c tomorrow when hold expires. However, she reports 

to MD and staff that she continues to have SI. 


Subjective: 


Met with patient and discussed with staff. Patient presents tearful and says 

she feels "depressed" b/c she misses her kids and "wants to be at home." MD 

reminded patient that the best thing she can do for her family right now is 

make sure she is getting the right treatment for her depression and give 

herself time to feel better. She still has SI, but no intent or plan to hurt 

herself now. 





Objective: 





 Vital Signs











Temp Pulse Resp BP Pulse Ox


 


 36.6 C   68   15   120/73   93 


 


 10/01/17 06:00  10/01/17 06:00  10/01/17 06:00  10/01/17 06:00  10/01/17 06:00








MSE: Tearful, crying, wearing ice pack, poor eye contact.  Affect: Crying  Mood

: "Sad"  TP: Linear, goal-directed  TC: Endorses SI, but no plan or intent, no 

AH/VH  Insight/Judgment: Poor





- Time Spent With Patient


Time Spent With Patient: 


20"








- Pending Discharge


Pending Discharge Within 48 Hours: No





ICD10 Worksheet


Patient Problems: 


 Problems











Problem Status Onset


 


Depression Acute  


 


Suicidal ideation Acute 80